# Patient Record
(demographics unavailable — no encounter records)

---
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
## 2025-07-16 NOTE — PHYSICAL EXAM
[General Appearance - Alert] : alert [General Appearance - In No Acute Distress] : in no acute distress [Extraocular Movements] : extraocular movements were intact [Musculoskeletal - Swelling] : no joint swelling seen [FreeTextEntry1] : Hypermobility of bilateral thumb toward the forearm
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
## 2025-07-16 NOTE — DATA REVIEWED
[FreeTextEntry1] : 1/23/25 AST 51, ALT 78 WBC 4.3, Hgb. 13.3, Platelet 248, ESR 2, CRP <3, Cr. 0.61, eGFR 119  CT of cervical spine (5/9/24 at )  IMPRESSION:  Slight reversal of normal cervical lordosis.  C5-6: Disc bulge impressing upon the ventral thecal sac and resulting mild spinal canal stenosis.   CT lumbar spine (5/9/24 at )  IMPRESSION:  L4-L5: A disc bulge impressing upon the thecal sac. Mild bilateral facet arthropathy. Mild spinal canal stenosis.  L5-S1: Status post discectomy with spacer placement. Streak artifacts limit evaluation of spinal canal. Left facet arthropathy resulting moderate left foraminal narrowing.   X-ray of LS spine (6/23/23 at )  IMPRESSION:  Status post disc replacement at L5-S1.  Mild dextroscoliosis.   A neurostimulator device is noted in place with leads extending into the thoracic spinal canal. Electrodes are seen extending from above the field of view to the T12 level.     MRI arthrogram of right shoulder (11/19/19 at )  IMPRESSION:  Nondisplaced superior-posterosuperior labrum tear with contrast imbibition.   MRI of right hip (10/4/19 at )  IMPRESSION:  Nondisplaced tearing of the anterior and superior right acetabular labrum.   X-ray of right hand (12/30/15 at )  IMPRESSION:  Unremarkable right hand examination.   X-ray of left hand (12/30/15 at )  IMPRESSION:  Unremarkable left hand examination.